# Patient Record
Sex: MALE | Race: BLACK OR AFRICAN AMERICAN
[De-identification: names, ages, dates, MRNs, and addresses within clinical notes are randomized per-mention and may not be internally consistent; named-entity substitution may affect disease eponyms.]

---

## 2019-01-14 ENCOUNTER — HOSPITAL ENCOUNTER (INPATIENT)
Dept: HOSPITAL 74 - JSAMEDAYSX | Age: 49
LOS: 3 days | Discharge: HOME | DRG: 304 | End: 2019-01-17
Attending: INTERNAL MEDICINE | Admitting: ORTHOPAEDIC SURGERY
Payer: COMMERCIAL

## 2019-01-14 VITALS — BODY MASS INDEX: 40.5 KG/M2

## 2019-01-14 DIAGNOSIS — E78.5: ICD-10-CM

## 2019-01-14 DIAGNOSIS — E11.9: ICD-10-CM

## 2019-01-14 DIAGNOSIS — M53.2X6: ICD-10-CM

## 2019-01-14 DIAGNOSIS — M51.16: Primary | ICD-10-CM

## 2019-01-14 DIAGNOSIS — M48.062: ICD-10-CM

## 2019-01-14 DIAGNOSIS — E87.1: ICD-10-CM

## 2019-01-14 DIAGNOSIS — I10: ICD-10-CM

## 2019-01-14 DIAGNOSIS — E66.9: ICD-10-CM

## 2019-01-14 DIAGNOSIS — I95.9: ICD-10-CM

## 2019-01-14 PROCEDURE — 4A1004G MONITORING OF CENTRAL NERVOUS ELECTRICAL ACTIVITY, INTRAOPERATIVE, OPEN APPROACH: ICD-10-PCS | Performed by: ORTHOPAEDIC SURGERY

## 2019-01-14 PROCEDURE — B01BZZZ FLUOROSCOPY OF SPINAL CORD: ICD-10-PCS | Performed by: ORTHOPAEDIC SURGERY

## 2019-01-14 PROCEDURE — 0QB00ZZ EXCISION OF LUMBAR VERTEBRA, OPEN APPROACH: ICD-10-PCS | Performed by: ORTHOPAEDIC SURGERY

## 2019-01-14 PROCEDURE — 0QN00ZZ RELEASE LUMBAR VERTEBRA, OPEN APPROACH: ICD-10-PCS | Performed by: ORTHOPAEDIC SURGERY

## 2019-01-14 PROCEDURE — 3E0T3BZ INTRODUCTION OF ANESTHETIC AGENT INTO PERIPHERAL NERVES AND PLEXI, PERCUTANEOUS APPROACH: ICD-10-PCS | Performed by: ORTHOPAEDIC SURGERY

## 2019-01-14 PROCEDURE — 0SG1071 FUSION OF 2 OR MORE LUMBAR VERTEBRAL JOINTS WITH AUTOLOGOUS TISSUE SUBSTITUTE, POSTERIOR APPROACH, POSTERIOR COLUMN, OPEN APPROACH: ICD-10-PCS | Performed by: ORTHOPAEDIC SURGERY

## 2019-01-14 RX ADMIN — SODIUM CHLORIDE, POTASSIUM CHLORIDE, SODIUM LACTATE AND CALCIUM CHLORIDE SCH MLS: 600; 310; 30; 20 INJECTION, SOLUTION INTRAVENOUS at 20:20

## 2019-01-14 RX ADMIN — INSULIN ASPART SCH: 100 INJECTION, SOLUTION INTRAVENOUS; SUBCUTANEOUS at 23:50

## 2019-01-14 RX ADMIN — ACETAMINOPHEN SCH MG: 10 INJECTION, SOLUTION INTRAVENOUS at 20:10

## 2019-01-14 RX ADMIN — SODIUM CHLORIDE, POTASSIUM CHLORIDE, SODIUM LACTATE AND CALCIUM CHLORIDE SCH MLS/HR: 600; 310; 30; 20 INJECTION, SOLUTION INTRAVENOUS at 23:51

## 2019-01-14 NOTE — CON.PULM
Consult


Consult Specialty:: ICU


Referred by:: Rebeca


Reason for Consultation:: ICU monitoring Post-op





- History of Present Illness


Chief Complaint: s/p revision laminectomies


History of Present Illness: 





48 yr old man with DMII on insulin and HTN s/p spinal surgery POD#0 for 

worsening back pain being monitored in the ICU.


Surgery as listed in OP note: 


1. L3, L4 revision laminectomies.  2. L3, L4 facetectomies/osteotomies.  3. 

Bilateral L3, L4 neurolysis.  4. Removal of hardware.  5. Inspection of fusion 

mass.  6. L3-S1 posterior instrumentation.  7. L3-S1 posterolateral 

arthrodesis.  8. Bone autograft.  9. Bone allograft.  10. Bone marrow 

aspiration.  11. Bilateral dorsal ramus nerve blocks L3-S1.  12. Intra-

operative fluoroscopy.  13. Intra-operative neural monitoring





currently on PCA, but pain is still 10/10 in lower back, nonradiating. c/o 

tingling in his left foot, same tingling he had prior to the surgery, not worse 

now.


family at bedside.


H&P in written chart.





Pmhx: HTN, DMII, HLD, obesity, PORSCHE on CPAP at home


Surghx: spinal surgery 2014


Allergies: cat dander, dust mite extraction, mold &smuts, pollen extract, no 

known drug allergies


Soc hx: soc drinker, nonsmoker, denies hx of drug use





PE:


NAD, laying on his back comfortably, alert & oriented x3


HEENT: trachea midline, no LAD, EOMI, ender, no oral lesions, facial symmetry


CV: s1, s2, without MRG


Lungs: anterior ausculation, CTAB


abd: soft, nontender, nondistended, quiet throughout


UE: 5/5 hand  UE, sensation grossly intact, 2+ b/l radial pulses


LE: +DP pulses b/l, sensation intact grossly. rom with extension and flexion 

intact at ankles and knees. no edema


- drain positioned on left side with dark blood











- History Source


History Provided By: Patient





- Alcohol/Substance Use


Hx Alcohol Use: No





- Smoking History


Smoking history: Never smoked





Home Medications





- Allergies


Allergies/Adverse Reactions: 


 Allergies











Allergy/AdvReac Type Severity Reaction Status Date / Time


 


No Known Allergies Allergy   Verified 01/14/19 10:30














- Home Medications


Home Medications: 


Ambulatory Orders





Calcium Carbonate/Vitamin D3 [Calcium 1,000 + D3 Caplet] 1 tab PO DAILY 01/11/ 19 


Cyclobenzaprine HCl [Flexeril -] 10 mg PO TID PRN 01/11/19 


Gabapentin 300 mg PO TID 01/11/19 


Insulin Degludec [Tresiba Flextouch U-100] 22 unit SQ HS 01/11/19 


Oxycodone HCl 30 mg PO Q4H 01/11/19 


metFORMIN HCL [Metformin HCl ER] 1,000 mg PO DAILY 01/11/19 


Lisinopril [Prinivil] 10 mg PO DAILY 01/14/19 











Physical Exam


Vital Sings: 


 Vital Signs











Temperature  98.4 F   01/14/19 21:15


 


Pulse Rate  89   01/14/19 21:30


 


Respiratory Rate  12   01/14/19 21:30


 


Blood Pressure  101/68   01/14/19 21:30


 


O2 Sat by Pulse Oximetry (%)  96   01/14/19 19:38














Assessment/Plan





48 yr old with DMII and HTN being monitered in the ICU POD#0 from spinal 

surgery. 





POD#0 from spinal surgery, instructed as per ortho note


- Pain control with dilaudid PCA 


 - will need bowel regimen when taking po


- incentive spirometry [id number]


- PT, OOB as per surgery


- drain in place, monitor I&O's


- adames to be removed when pt ambulatory


- pain control with dilauded PCA





Cardiovascular


HTN


- lisinopril 


HLD


- crestor HS





Pulmonary


cpap ordered to bedside





Endocrine


DMII last A1c 12/20 9.1 - uncontrolled


- BGM and NISS ACHS while inpatient, coverage to start when eating, resume 

metformin and tresiba at dc


- NPO until flatus as per surgical team





Prophylaxis


DVT


- mechanical only, TEDS/SCDs 


GI


- famotadine BID, bowel regimen when taking po





FEN


Diet


- NPO until flatus, then diabetic/low sodium

## 2019-01-14 NOTE — PN
Progress Note (short form)





- Note


Progress Note: 





48M s/p L3, L4 revision laminectomies; L3, L4 facetectomies/osteotomies; 

bilateral L3, L4 neurolysis; removal of hardware; inspection of fusion mass; L3-

S1 posterior instrumentation; L3-S1 posterolateral arthrodesis; bone graft; 

bone marrow aspiration POD #0.





-Admit to ICU post-op.





-Pain control: per anaesthesia team; recommend PCA; no NSAID's.


-DVT PPx:


   - Mechanical only: REMI's, SCD's.


-Incentive spirometry q15min.


-PT/OT/Rehab, OOB.


-WBAT B/L LE.


-q4h B/L LE NV checks.


-Post-op antibiotics x 2 doses.


-NPO until flatus.


-f/u AM labs.


-f/u drain output.


-f/u UA (urine cloudy at end of case).


-d/c Song catheter when patient ambulating comfortably.


-Care per ICU & medical hospitalist team.


-Discharge planning: f/u 1/25/2019 at [facility] [place] office; call for 

appointment; [phone].


-Will follow.





Nav Jose MD (Orthopaedic Surgery).

## 2019-01-14 NOTE — OP
Operative Note





- Note:


Operative Date: 01/14/19


Pre-Operative Diagnosis: 1. L3-L4, L4-L5 intervertebral disc disorders with 

lower extremity radiculopathy.  2. L3-4, L4-L5 adjacent level disease.  3. L3-

L5 spinal stenosis with neurogenic claudication.  4. L3-L5 segmental instability


Operation: 1. L3, L4 revision laminectomies.  2. L3, L4 facetectomies/

osteotomies.  3. Bilateral L3, L4 neurolysis.  4. Removal of hardware.  5. 

Inspection of fusion mass.  6. L3-S1 posterior instrumentation.  7. L3-S1 

posterolateral arthrodesis.  8. Bone autograft.  9. Bone allograft.  10. Bone 

marrow aspiration.  11. Bilateral dorsal ramus nerve blocks L3-S1.  12. Intra-

operative fluoroscopy.  13. Intra-operative neural monitoring


Post-Operative Diagnosis: Same as Pre-op


Surgeon: Nav Jose


Assistant: Juan Jose


Anesthesiologist/CRNA: Jennifer Leyva


Anesthesia: General, Local


Specimens Removed: L5, S1 hardware (4 x screws & caps, 2 x rods)


Estimated Blood Loss (mls): 600


Drains & Tubes with Location: 1 x superficial HemoVac


Blood Volume Replaced (mls): 140 (Cell Saver)


Fluid Volume Replaced (mls): 3,000 (Crystalloid)


Operative Report Dictated: Yes

## 2019-01-15 LAB
ANION GAP SERPL CALC-SCNC: 7 MMOL/L (ref 8–16)
BUN SERPL-MCNC: 14 MG/DL (ref 7–18)
CALCIUM SERPL-MCNC: 8.8 MG/DL (ref 8.5–10.1)
CHLORIDE SERPL-SCNC: 97 MMOL/L (ref 98–107)
CO2 SERPL-SCNC: 30 MMOL/L (ref 21–32)
CREAT SERPL-MCNC: 1 MG/DL (ref 0.55–1.3)
DEPRECATED RDW RBC AUTO: 15.3 % (ref 11.9–15.9)
GLUCOSE SERPL-MCNC: 107 MG/DL (ref 74–106)
HCT VFR BLD CALC: 36.7 % (ref 35.4–49)
HGB BLD-MCNC: 11.5 GM/DL (ref 11.7–16.9)
MAGNESIUM SERPL-MCNC: 1.8 MG/DL (ref 1.8–2.4)
MCH RBC QN AUTO: 27.9 PG (ref 25.7–33.7)
MCHC RBC AUTO-ENTMCNC: 31.5 G/DL (ref 32–35.9)
MCV RBC: 88.6 FL (ref 80–96)
PHOSPHATE SERPL-MCNC: 5.4 MG/DL (ref 2.5–4.9)
PLATELET # BLD AUTO: 214 K/MM3 (ref 134–434)
PMV BLD: 7.8 FL (ref 7.5–11.1)
POTASSIUM SERPLBLD-SCNC: 4.7 MMOL/L (ref 3.5–5.1)
RBC # BLD AUTO: 4.14 M/MM3 (ref 4–5.6)
SODIUM SERPL-SCNC: 134 MMOL/L (ref 136–145)
WBC # BLD AUTO: 7.4 K/MM3 (ref 4–10)

## 2019-01-15 RX ADMIN — SODIUM CHLORIDE, POTASSIUM CHLORIDE, SODIUM LACTATE AND CALCIUM CHLORIDE SCH MLS/HR: 600; 310; 30; 20 INJECTION, SOLUTION INTRAVENOUS at 15:48

## 2019-01-15 RX ADMIN — INSULIN ASPART SCH: 100 INJECTION, SOLUTION INTRAVENOUS; SUBCUTANEOUS at 11:38

## 2019-01-15 RX ADMIN — SODIUM CHLORIDE, POTASSIUM CHLORIDE, SODIUM LACTATE AND CALCIUM CHLORIDE SCH MLS/HR: 600; 310; 30; 20 INJECTION, SOLUTION INTRAVENOUS at 22:10

## 2019-01-15 RX ADMIN — SENNOSIDES SCH TAB: 8.6 TABLET, FILM COATED ORAL at 22:09

## 2019-01-15 RX ADMIN — ACETAMINOPHEN SCH MG: 10 INJECTION, SOLUTION INTRAVENOUS at 11:41

## 2019-01-15 RX ADMIN — ACETAMINOPHEN SCH MG: 10 INJECTION, SOLUTION INTRAVENOUS at 05:00

## 2019-01-15 RX ADMIN — INSULIN ASPART SCH: 100 INJECTION, SOLUTION INTRAVENOUS; SUBCUTANEOUS at 07:00

## 2019-01-15 RX ADMIN — CEFAZOLIN SODIUM SCH MLS/HR: 2 SOLUTION INTRAVENOUS at 00:00

## 2019-01-15 RX ADMIN — CEFAZOLIN SODIUM SCH MLS/HR: 2 SOLUTION INTRAVENOUS at 09:24

## 2019-01-15 RX ADMIN — SODIUM CHLORIDE, POTASSIUM CHLORIDE, SODIUM LACTATE AND CALCIUM CHLORIDE SCH: 600; 310; 30; 20 INJECTION, SOLUTION INTRAVENOUS at 23:45

## 2019-01-15 NOTE — PN
Physical Exam: 


SUBJECTIVE: Patient seen and examined this morning at bedside. Continues to 

have 8/10 , constant sharp pain at the wound site, worse with movement. Passed 

Flatus this morning. Tolerated breakfast without nausea, vomiting. Song 

catheter d/c'ed this morning. Denies any fevers, chills, chest pain, SOB, 

diarrhea.








OBJECTIVE:





 Vital Signs











 Period  Temp  Pulse  Resp  BP Sys/Mackay  Pulse Ox


 


 Last 24 Hr  97.7 F-99.4 F    10-19  /45-83  95-99











GENERAL: A&Ox3, NAD, lying with HOB elevated


HEAD: NCAT


EYES: PERRL, EOMI


ENT: Oropharynx clear without exudates, moist mucous membranes.


NECK: No JVD


LUNGS: CTA on anterior exam


HEART: Regular rate and rhythm, S1, S2 without murmur


ABDOMEN: Soft, nontender, nondistended, + bowel sounds, no guarding


EXTREMITIES: 2+ pulses, no edema


NEUROLOGICAL: Cranial nerves II through XII grossly intact. Normal speech, gait 

not observed. Upper extremity muscle strength 5/5. Right Lower extremity muslce 

strength 4/5 throughout. Left lower extremity muscle strength 1/5 to hip 

flexion. Decreased sensation on the Left lower extremity. 


SKIN: Warm, dry








 Laboratory Results - last 24 hr











  01/14/19 01/15/19 01/15/19





  19:51 05:30 05:30


 


WBC   7.4 


 


RBC   4.14 


 


Hgb   11.5 L 


 


Hct   36.7 


 


MCV   88.6 


 


MCH   27.9 


 


MCHC   31.5 L 


 


RDW   15.3 


 


Plt Count   214 


 


MPV   7.8 


 


Sodium    134 L


 


Potassium    4.7


 


Chloride    97 L


 


Carbon Dioxide    30


 


Anion Gap    7 L


 


BUN    14


 


Creatinine    1.0


 


Creat Clearance w eGFR    > 60


 


POC Glucometer  128  


 


Random Glucose    107 H


 


Calcium    8.8


 


Phosphorus    5.4 H


 


Magnesium    1.8








Active Medications





Hydromorphone HCl (Dilaudid Pca -)  10 mg PCA PCA [facility]; Protocol


   Stop: 01/17/19 20:14


   Last Admin: 01/14/19 20:20 Dose:  10 mg


Lactated Ringer's (Lactated Ringers Solution)  1,000 mls @ 125 mls/hr IV ASDIR 

[facility]


   Last Admin: 01/14/19 23:51 Dose:  125 mls/hr


Insulin Aspart (Novolog Vial Sliding Scale -)  1 vial SQ TIDAC [facility]; Protocol











ASSESSMENT/PLAN:


47 y/o M with PMHx of DMII and HTN POD#1 from Lumbar spinal surgery





#Neuro


POD#1 Lumbar spinal surgery, instructed as per ortho note


-Pain control via PO Oxycodone


-Incentive spirometry [id number]


-PT, OOB as per surgery


-Drain in place (1/14), monitor I&O's


-Song removed this AM, monitor urine output





#Cardiovascular


HTN


-Lisinopril 


HLD


-Crestor HS





#Pulmonary


-CPAP HS





#Endocrine


DMII 


-Last A1c 9.1


-BGM ISS ACHS 





#FEN


-PO Fluids


-continue to monitor for hyponatremia


-Diabetic Diet





#PPX


-DVT: SCDs 


-GI: Famotadine BID, bowel regimen when taking po





#Code Status: Full Code





Dispo: Transfer to [facility]-surg











Visit type





- Emergency Visit


Emergency Visit: Yes


ED Registration Date: 01/14/19


Care time: The patient presented to the Emergency Department on the above date 

and was hospitalized for further evaluation of their emergent condition.





- New Patient


This patient is new to me today: Yes


Date on this admission: 01/15/19





- Critical Care


Critical Care patient: Yes


Total Critical Care Time (in minutes): 40


Critical Care Statement: The care of this patient involved high complexity 

decision making to prevent further life threatening deterioration of the patient

's condition and/or to evaluate & treat vital organ system(s) failure or risk 

of failure.

## 2019-01-15 NOTE — OP
DATE OF OPERATION:  01/14/2019

 

SURGEON:  Nav Jose MD

 

ASSISTANT:  Juan Jose MD

 

PRE-OPERATIVE DIAGNOSES:  

1. L3-L4, L4-L5 intervertebral disc disorders with lower extremity 
radiculopathy.  

2. L3-4, L4-L5 adjacent level disease.  

3. L3-L5 spinal stenosis with neurogenic claudication.  

4. L3-L5 segmental instability



POST-OPERATIVE DIAGNOSES:  

1. L3, L4 revision laminectomies.  

2. L3, L4 facetectomies/osteotomies.  

3. Bilateral L3, L4 neurolysis.  

4. Removal of hardware.  

5. Inspection of fusion mass.  

6. L3-S1 posterior instrumentation.  

7. L3-S1 posterolateral arthrodesis.  

8. Bone autograft.  

9. Bone allograft.  

10. Bone marrow aspiration.  

11. Bilateral dorsal ramus nerve blocks L3, L4, L5, S1.

12. Intra-operative fluoroscopy.

13. Intra-operative neural monitoring

 

BLOOD LOSS:  650 mL; Cell Saver utilized.

 

PROCEDURE:  Patient correctly identified, brought to the operating room, placed 
prone

on a Jensen table.  Lumbar spine was prepped, window draped with Betadine scrub

solution, wiped off with alcohol, DuraPrep applied.  Kefzol 2 g and vancomycin 
1 g

given preoperative.  All bony points were padded appropriately.  Eyes were 
noted and

free of pressure, and position of the table was about 10 degrees anti-
Trendelenburg

for venous optic vein flow reasons.  The original wound was opened and then 
extended

proximally to expose the lumbar spine from L1 to S1.  He is a large, muscular 
man

and, hence, the need for this extent of procedure and length of wound; this to 
avoid

any excessive forces of self-retaining retraction and fear of sepsis.  The 
spinous

process of L3 and L4 were identified, dissected completely.  Soft tissues 
extending

out laterally over the facet joints to the tips of the transverse processes,

extending from L3-L4 right down to the L5 bone graft site.  The original screws 
were

dissected free of soft tissue, the hardware removed.  The fusion mass was 
inspected

and found to be completely solid.

 

The next part of the operation was a difficult neurosurgical endeavor.  This was

adherent, stuck-down scar tissue at L3 and L4 onto the theca where the stenosis 
was

clearly apparent on MRI.  This necessitated a meticulous freeing of soft tissue

elements off the bony margins of the inferior margins of each lamina as well as

laterally onto the vertebral canal.  Once the central portion of soft tissue was

freed off the bone utilizing a curette with the rounded edge facing the dura at 
all

times, the actual bone was resected using Leksell rongeurs as well as Kerrison

upcuts.  Once that had been performed and the vertebral canal identified, the 
thecal

elements clearly seen.  The challenge here was freeing the recesses which were

adherent and stuck down with scar tissue and reactive bone formation.  Using

osteotomes, splitting the pars interarticularis to the inferior facet at each 
level,

that is L3 and L4.  The bone was imploded inwards, and that is centrally 
towards the

canal and retrieved for bone graft purposes.  Each superior facet readily 
coming into

view revealed:

1.  Severe arthritic changes at each facet joint level.

2.  Complete blockage of the recess at this level, necessitating very cautious

freeing of soft tissue off the undersurface of the superior facet and the 
appropriate

undercutting facetectomy performed with a range of different-size Kerrison up-
cut

rongeurs.

 

Complete freeing of the thecal canal performed.  This entailed also a thorough 
fibrous

neurolysis of the L3 and L4 nerve roots, both left- and right-hand side.  The L3
-4

level was laterally extended to complete a full Smith-Montejo osteotomy to 
ensure

adequate lordosis to the spine.  Once this decompression had been performed, 
marrow

from the left posterior ilium was harvested via a Jamshidi needle placed into 
the

ilium on the left.  The pedicles of L3 and L4 were identified using anatomical

guidelines as well as positioning of the drill bits for each pedicle to seat the

screw based under the endplates of each level.  A lateral fluoroscopic x-ray

facilitated this process.  Each screw measured 6.5 x 45, excepting the right S1 
screw

which measured 6.5 x 35, and the left S1 screw measured 6.5 x 40.  Each screw 
was

tested with neuromonitoring by the neuromonitoring team and found to be well 
above

the said parameters, each screw being well above 20 mA.  The right L4 screw 
measured

12.  Lateral and AP fluoroscopic x-rays were then taken at that point showing

excellent positioning and seating of all components.  At this point, because of 
the

thickness of the dura and the difficulty in mobilization of the dura, it was 
elected

not to do any interbody cages.  If any interbody cages have to be entertained 
in the

future, these would be done via an LLIF-type approach.  Once the 
neuromonitoring was

completed, another gram of Kefzol was given.  The wounds were thoroughly lavaged

throughout the procedure.  The retractors were released readily every 15-20 
minutes

and repositioned and the muscle gently massaged.  The rods were contoured, 
fixed to

the screw heads by the appropriate torque device driven cap.  Solid fixation

achieved.  Bone grafting was a standard posterolateral arthrodesis, combination 
of

autologous bone expanded with allograft bone and mixed with the CD34 cells 
harvested

from the left bone marrow aspirate concentrate accordingly.  The wounds were

thoroughly lavaged once again.  All extraneous bone on the theca was removed, 
leaving

the vision of a central, widely-decompressed vertebral canal; well-seated

instrumentation lateral to this; and lateral to that, the bone graft in the

posterolateral arthrodesis site.  Closure in 4 layers.  This was thus a complex 
wound

closure, muscle 1 Vicryl, fascia 1 Vicryl, subcutaneous 1 and 2-0 Vicryl, skin

staples.  Drainage:  One 1/8-inch Hemovac subcutaneously only.  Operation was

difficult but went extremely well.  No complications.

 

 

MD MARK Crespo/[id number]

DD: 01/14/2019 19:52

DT: 01/15/2019 10:32

Job #:  [id number]

MTDD

## 2019-01-15 NOTE — PN
Physical Exam: 


SUBJECTIVE: Patient seen and examined at bedside. No acute events overnight. Pt 

admits to pain, but well-controlled. Denies chest pain, sob, abd pain. No other 

complaints.








OBJECTIVE:





 Vital Signs











Temperature  99.0 F   01/15/19 10:00


 


Pulse Rate  72   01/15/19 12:00


 


Respiratory Rate  15   01/15/19 12:00


 


Blood Pressure  100/59 L  01/15/19 12:00


 


O2 Sat by Pulse Oximetry (%)  99   01/15/19 14:17




















GENERAL: AAOx3. Comfortable. NAD. 


HEENT: AT/NC. EOMI. TODD. MMM. 


NECK: Trachea midline, full range of motion, supple. 


LUNGS: CTA B/L. No wheezes noted. Symmetric chest rise.


HEART: RRR. Normal S1 S2. No murmurs noted.


ABDOMEN: Soft NT/ND. +BS in all 4Q's. No masses noted. 


EXTREMITIES: 2+ pulses, warm, well-perfused, no edema. u/l b/l extremities. 5/5 

muscle strength in b/l UE. 4/5 RLE hip flexion. 5/5 RLE knee flexion/extension. 

3/5 LLE hip flexion. 5/5 b/l dorsi/plantarflexion. Decreased sensation in L foot

, dorsal and plantar surface. 


MSK: Surgical dressing in lumbar region c/d/i. 


NEUROLOGICAL: Cranial nerves II through XII grossly intact. Normal speech. 

Facial muscles intact. 


PSYCH: Normal mood, normal affect.


SKIN: Warm, dry, normal turgor, no rashes or lesions noted














 CBCD











WBC  7.4 K/mm3 (4.0-10.0)   01/15/19  05:30    


 


RBC  4.14 M/mm3 (4.00-5.60)   01/15/19  05:30    


 


Hgb  11.5 GM/dL (11.7-16.9)  L  01/15/19  05:30    


 


Hct  36.7 % (35.4-49)   01/15/19  05:30    


 


MCV  88.6 fl (80-96)   01/15/19  05:30    


 


MCHC  31.5 g/dl (32.0-35.9)  L  01/15/19  05:30    


 


RDW  15.3 % (11.9-15.9)   01/15/19  05:30    


 


Plt Count  214 K/MM3 (134-434)   01/15/19  05:30    


 


MPV  7.8 fl (7.5-11.1)   01/15/19  05:30    








 CMP











Sodium  134 mmol/L (136-145)  L  01/15/19  05:30    


 


Potassium  4.7 mmol/L (3.5-5.1)   01/15/19  05:30    


 


Chloride  97 mmol/L ()  L  01/15/19  05:30    


 


Carbon Dioxide  30 mmol/L (21-32)   01/15/19  05:30    


 


Anion Gap  7 MMOL/L (8-16)  L  01/15/19  05:30    


 


BUN  14 mg/dL (7-18)   01/15/19  05:30    


 


Creatinine  1.0 mg/dL (0.55-1.3)   01/15/19  05:30    


 


Creat Clearance w eGFR  > 60  (>60)   01/15/19  05:30    


 


Calcium  8.8 mg/dL (8.5-10.1)   01/15/19  05:30    














Active Medications





Lactated Ringer's (Lactated Ringers Solution)  1,000 mls @ 125 mls/hr IV ASDIR 

[facility]


   Last Admin: 01/15/19 15:48 Dose:  125 mls/hr


Insulin Aspart (Novolog Vial Sliding Scale -)  1 vial SQ TIDAC [facility]; Protocol


Oxycodone HCl (Roxicodone -)  5 mg PO Q4H PRN


   PRN Reason: PAIN LEVEL 1-5


Oxycodone HCl (Roxicodone -)  10 mg PO Q6H PRN


   PRN Reason: PAIN LEVEL 6-10








ASSESSMENT/PLAN:


49 y/o man with h/o DM , HTN, HL, and lower back pain who presented  for lumbar 

sx 





#L3-S1 disk disease with lumbar stenosis; s/p Lumbar sx. POD 1 


-Pt currently doing well, +flatus, antonio PO diet.


-cont PCA


-Diabetic diet


-PT when allowed out of bed


-Miralax/Colace/Senna


-incentive spirometer 





#HTN:  BP in 90s


-hold lisinopril for now as pt is hypotensive. Will resume when hypertensive.


-cont IVF 





#DM.


-cont SSI; 


-Hold home med Metformin and Tresiba





#Prophylaxis


-DVT: SCDs





#FEN


-no IVf


-recheck lytes in AM


-Diabetic diet








Visit type





- Emergency Visit


Emergency Visit: Yes


ED Registration Date: 01/14/19


Care time: The patient presented to the Emergency Department on the above date 

and was hospitalized for further evaluation of their emergent condition.





- New Patient


This patient is new to me today: Yes


Date on this admission: 01/15/19





- Critical Care


Critical Care patient: Yes


Total Critical Care Time (in minutes): 35


Critical Care Statement: The care of this patient involved high complexity 

decision making to prevent further life threatening deterioration of the patient

's condition and/or to evaluate & treat vital organ system(s) failure or risk 

of failure.

## 2019-01-15 NOTE — PN
Progress Note (short form)





- Note


Progress Note: 





Anesthesiology Post-op/Pain Service


48 y.o. man POD#1 s/p lumbar spine surgery under GA with post-op PCA.


Pt is sitting-up, feels well. He does admit to some pain but states that PCA 

helps. VSS. No other complaints.


48 y.o. man with stable post-operative course.


Continue PCA for now until pt. taking PO. Encouraged incentive spirometry.

## 2019-01-15 NOTE — PN
Teaching Attending Note


Name of Resident: Gaby Kearney





ATTENDING PHYSICIAN STATEMENT





I saw and evaluated the patient.


I reviewed the resident's note and discussed the case with the resident.


I agree with the resident's findings and plan as documented.








SUBJECTIVE:


No fever or chills . back pain is controlled with PCA. No abd pain, had flatus 

this am. no N/V. still has numbness in sole of his L foot but numbness in L 

thigh has resolved 





OBJECTIVE:


NAD .MMM


Lungs: CTAB 


CV: RRR


Abd: soft, Nl BS , NT, ND , obese 


Ext : no edema or erythema 


Neuro of LE:  


RLE: Hip felxion 4/5 limited by pain. knee flexion /extension 5/5, ankle 

dorsiflexion and plantar flexion 5/5 . 


LLE: hip flexion 2/5 due ot pain .  knee flexion and extension couldn't be 

assessed due ot pain. ankle dorsiflexion and plantar flexion 5/5. 


reflexes : Knee jerk 1+ on L , 2+ on right . neg babinski's 


decreased sensation on L foot ( dorsum and plantar area) 





ASSESSMENT AND PLAN:








49 y/o man with h/o DM , HTN, HL, and lower back pain who presented  for lumbar 

sx 





1- L3-S1 disk disease with lumbar stenosis s/p Lumbar sx. POD 1 


doing well , pain is controlled, and had flatus. tolerated breakfast 


- cont PCA


- cont diet 


- PT when allowed out  of bed.


- start bowel regimen


- incentive spirometer 





2- HTN:  BP in 90s . 


- hold lisinopril . can resume when htypertensive 


- cont IVF 





3- DM. sugar normal now.


- cont SSI 


- monitor the need to place on long acting if sugar is elevated 


- at home uses metformin and Tresiba 





4- DVT : SCds 


no need for GI px

## 2019-01-15 NOTE — PN
Teaching Attending Note


Name of Resident: Keith Cota





ATTENDING PHYSICIAN STATEMENT





I saw and evaluated the patient.


I reviewed the resident's note and discussed the case with the resident.


I agree with the resident's findings and plan as documented.








SUBJECTIVE:


Patient seen and examined in the ICU.  Awake and alert.  


Pain at surgical site: 7/10. 


No CP or SOB. 


Used CPAP overnight for a short time. 





 Intake & Output











 01/12/19 01/13/19 01/14/19 01/15/19





 23:59 23:59 23:59 23:59


 


Intake Total   3405 1407


 


Output Total   1350 1520


 


Balance   2055 -113


 


Weight   259 lb 7.745 oz 259 lb 7.745 oz








 Last Vital Signs











Temp Pulse Resp BP Pulse Ox


 


 99.0 F   72   15   100/59 L  99 


 


 01/15/19 10:00  01/15/19 12:00  01/15/19 12:00  01/15/19 12:00  01/15/19 09:00








Active Medications





Hydromorphone HCl (Dilaudid Pca -)  10 mg PCA PCA [facility]; Protocol


   Stop: 01/17/19 20:14


   Last Admin: 01/14/19 20:20 Dose:  10 mg


Lactated Ringer's (Lactated Ringers Solution)  1,000 mls @ 125 mls/hr IV ASDIR 

BAYLEE


   Last Admin: 01/14/19 23:51 Dose:  125 mls/hr


Insulin Aspart (Novolog Vial Sliding Scale -)  1 vial SQ TIDAC [facility]; Protocol





NAD, Awake and alert, NAD 


HEENT: trachea midline 


CV: S1, S2 


Lungs: CTAB


Abd: soft, nontender, nondistended, quiet throughout


UE: 5/5 hand  UE, sensation grossly intact, 2+ b/l radial pulses


LE: +DP pulses b/l, sensation intact grossly. rom with extension and flexion 

intact at ankles and knees. no edema


Skin: drain positioned on left side with dark blood








 Laboratory Results - last 24 hr











  01/14/19 01/15/19 01/15/19





  19:51 05:30 05:30


 


WBC   7.4 


 


RBC   4.14 


 


Hgb   11.5 L 


 


Hct   36.7 


 


MCV   88.6 


 


MCH   27.9 


 


MCHC   31.5 L 


 


RDW   15.3 


 


Plt Count   214 


 


MPV   7.8 


 


Sodium    134 L


 


Potassium    4.7


 


Chloride    97 L


 


Carbon Dioxide    30


 


Anion Gap    7 L


 


BUN    14


 


Creatinine    1.0


 


Creat Clearance w eGFR    > 60


 


POC Glucometer  128  


 


Random Glucose    107 H


 


Calcium    8.8


 


Phosphorus    5.4 H


 


Magnesium    1.8

















Assessment/Plan


POD # 1: 


1. L3, L4 revision laminectomies


2. L3, L4 facetectomies/osteotomies


3. Bilateral L3, L4 neurolysis


4. Removal of hardware


5. Inspection of fusion mass


6. L3-S1 posterior instrumentation


7. L3-S1 posterolateral arthrodesis


8. Bone autograft


9. Bone allograft


10. Bone marrow aspiration


11. Bilateral dorsal ramus nerve blocks L3-S1


12. Intra-operative fluoroscopy


13. Intra-operative neural monitoring





OSAS on home CPAP 


HTN 


HPL 


DM 








Pain control 


Incentive Spirometry 


VTE prophylaxis 


O2 as needed 


CPAP QHS 


Neuro checks 


Continue home meds 


Glycemic control 


Floor when cleared by surgical attending 








Dr Sharp

## 2019-01-16 LAB
ALBUMIN SERPL-MCNC: 3 G/DL (ref 3.4–5)
ALP SERPL-CCNC: 60 U/L (ref 45–117)
ALT SERPL-CCNC: 20 U/L (ref 13–61)
ANION GAP SERPL CALC-SCNC: 8 MMOL/L (ref 8–16)
APPEARANCE UR: CLEAR
AST SERPL-CCNC: 36 U/L (ref 15–37)
BASOPHILS # BLD: 0.4 % (ref 0–2)
BILIRUB SERPL-MCNC: 0.8 MG/DL (ref 0.2–1)
BILIRUB UR STRIP.AUTO-MCNC: NEGATIVE MG/DL
BUN SERPL-MCNC: 15 MG/DL (ref 7–18)
CALCIUM SERPL-MCNC: 8.1 MG/DL (ref 8.5–10.1)
CHLORIDE SERPL-SCNC: 96 MMOL/L (ref 98–107)
CO2 SERPL-SCNC: 29 MMOL/L (ref 21–32)
COLOR UR: (no result)
CREAT SERPL-MCNC: 1.1 MG/DL (ref 0.55–1.3)
DEPRECATED RDW RBC AUTO: 14.8 % (ref 11.9–15.9)
EOSINOPHIL # BLD: 0.7 % (ref 0–4.5)
GLUCOSE SERPL-MCNC: 103 MG/DL (ref 74–106)
HCT VFR BLD CALC: 33.4 % (ref 35.4–49)
HGB BLD-MCNC: 11.2 GM/DL (ref 11.7–16.9)
KETONES UR QL STRIP: (no result)
LEUKOCYTE ESTERASE UR QL STRIP.AUTO: NEGATIVE
LYMPHOCYTES # BLD: 19.6 % (ref 8–40)
MAGNESIUM SERPL-MCNC: 2.1 MG/DL (ref 1.8–2.4)
MCH RBC QN AUTO: 29.5 PG (ref 25.7–33.7)
MCHC RBC AUTO-ENTMCNC: 33.5 G/DL (ref 32–35.9)
MCV RBC: 88.1 FL (ref 80–96)
MONOCYTES # BLD AUTO: 12.4 % (ref 3.8–10.2)
NEUTROPHILS # BLD: 66.9 % (ref 42.8–82.8)
NITRITE UR QL STRIP: NEGATIVE
PH UR: 6 [PH] (ref 5–8)
PHOSPHATE SERPL-MCNC: 4.2 MG/DL (ref 2.5–4.9)
PLATELET # BLD AUTO: 198 K/MM3 (ref 134–434)
PMV BLD: 7.7 FL (ref 7.5–11.1)
POTASSIUM SERPLBLD-SCNC: 4 MMOL/L (ref 3.5–5.1)
PROT SERPL-MCNC: 6.3 G/DL (ref 6.4–8.2)
PROT UR QL STRIP: NEGATIVE
PROT UR QL STRIP: NEGATIVE
RBC # BLD AUTO: 3.79 M/MM3 (ref 4–5.6)
SODIUM SERPL-SCNC: 133 MMOL/L (ref 136–145)
SP GR UR: 1.01 (ref 1.01–1.03)
UROBILINOGEN UR STRIP-MCNC: NEGATIVE MG/DL (ref 0.2–1)
WBC # BLD AUTO: 9.4 K/MM3 (ref 4–10)

## 2019-01-16 RX ADMIN — POLYETHYLENE GLYCOL 3350 SCH GM: 17 POWDER, FOR SOLUTION ORAL at 10:13

## 2019-01-16 RX ADMIN — INSULIN ASPART SCH: 100 INJECTION, SOLUTION INTRAVENOUS; SUBCUTANEOUS at 06:01

## 2019-01-16 RX ADMIN — SODIUM CHLORIDE, POTASSIUM CHLORIDE, SODIUM LACTATE AND CALCIUM CHLORIDE SCH MLS/HR: 600; 310; 30; 20 INJECTION, SOLUTION INTRAVENOUS at 15:42

## 2019-01-16 RX ADMIN — INSULIN ASPART SCH: 100 INJECTION, SOLUTION INTRAVENOUS; SUBCUTANEOUS at 16:57

## 2019-01-16 RX ADMIN — ACETAMINOPHEN PRN MG: 325 TABLET ORAL at 16:24

## 2019-01-16 RX ADMIN — DOCUSATE SODIUM SCH MG: 100 CAPSULE, LIQUID FILLED ORAL at 10:13

## 2019-01-16 RX ADMIN — INSULIN ASPART SCH: 100 INJECTION, SOLUTION INTRAVENOUS; SUBCUTANEOUS at 11:24

## 2019-01-16 RX ADMIN — GABAPENTIN SCH MG: 300 CAPSULE ORAL at 10:14

## 2019-01-16 RX ADMIN — GABAPENTIN SCH MG: 300 CAPSULE ORAL at 21:21

## 2019-01-16 RX ADMIN — SENNOSIDES SCH TAB: 8.6 TABLET, FILM COATED ORAL at 21:21

## 2019-01-16 RX ADMIN — SODIUM CHLORIDE, POTASSIUM CHLORIDE, SODIUM LACTATE AND CALCIUM CHLORIDE SCH MLS/HR: 600; 310; 30; 20 INJECTION, SOLUTION INTRAVENOUS at 07:21

## 2019-01-16 NOTE — PATH
Surgical Pathology Report



Patient Name:  PARIL MASTERSON

Accession #:  

Med. Rec. #:  [id number]                                                        

   /Age/Gender:  1970 (Age: 48) / M

Account:  [id number]                                                          

             Location: [facility] MED/SURG

Taken:  2019

Received:  1/15/2019

Reported:  2019

Physicians:  Nav Jose M.D.

  



Specimen(s) Received

 HARDWARE FROM LUMBAR REGION 





Clinical History

Intravertebral disc disorder







Final Diagnosis

LUMBAR REGION, HARDWARE, REMOVAL:

SURGICAL HARDWARE. MACROSCOPIC DIAGNOSIS.





***Electronically Signed***

Carolina Bustamante M.D.





Gross Description

Received fresh labeled "hardware from lumbar region," are 2 gray metallic rods

averaging 3.7 cm in length. Also received within the same container are 8

metallic screws ranging from 0.4-6.0 cm in greatest dimension. No soft tissue is

present. No sections are submitted, gross only.

/1/15/2019



saudi/1/15/2019

## 2019-01-16 NOTE — HOSP
Subjective





- Review of Symptoms


Subjective: 





Was called to evaluate the patient for persistent hypotension. Patient has low 

BP in the 100's systolic at baseline. over the past 24 hrs, he has had BP in 

the 80's/40's despite being on LR @ 125 throughout. 





Upon evaluation, patient continues to c/o pain, but is A&O x3 and denies 

dizziness or lightheadedness. 





General: No: Chills, Fatigue


HEENT: No: Visual Changes


Musculoskeletal: Yes: Back Pain


Neurological: No: Weakness, Confusion





Physical Examination


Vital Signs: 


 Vital Signs











Temperature  101.4 F H  01/16/19 02:10


 


Pulse Rate  99 H  01/16/19 02:10


 


Respiratory Rate  20   01/16/19 02:10


 


Blood Pressure  88/44 L  01/16/19 02:10


 


O2 Sat by Pulse Oximetry (%)  96   01/16/19 00:45











Constitutional: Yes: No Distress, Calm


Neurological: Yes: Alert, Oriented


Labs: 


 CBC, BMP





 01/15/19 05:30 





 01/15/19 05:30 











Hospitalist Encounter


Assessment: 





Was called earlier in the night for fever to 101, which was attributed to post 

op fever. This fever responded to tylenol.





The cause of the patient's ssx is likely a combination of opiate pain 

medication and possible atalectasis/ routine post op fever, but given this 

young patient has persistently low bp's with tachycardia despite fluid 

resuscitation, sepsis must be ruled out. 





Will obtain Blood cx, lactic acid, CXR and will bolus the patient with 1L NS in 

addition to his current fluids. Signed the patient out to day team to reassess 

after bolus. 





Visit type





- Emergency Visit


Emergency Visit: Yes


ED Registration Date: 01/14/19


Care time: The patient presented to the Emergency Department on the above date 

and was hospitalized for further evaluation of their emergent condition.





- New Patient


This patient is new to me today: Yes


Date on this admission: 01/16/19





- Critical Care


Critical Care patient: No

## 2019-01-16 NOTE — PN
Physical Exam: 


SUBJECTIVE: Patient seen and examined at bedside. Per nurse, pt was hypotensive 

81/41 overnight, given IVf bolus. This AM BP now 117/61. Pt is complaining of 

pain as pain med was not given due to hypotension. Denies chest pain, sob, ha, 

dizziness, abd pain. Antonio PO diet.








OBJECTIVE:





 Vital Signs











Temperature  99.8 F H  01/16/19 07:10


 


Pulse Rate  95 H  01/16/19 07:10


 


Respiratory Rate  20   01/16/19 07:10


 


Blood Pressure  117/61   01/16/19 07:10


 


O2 Sat by Pulse Oximetry (%)  96   01/16/19 00:45




















GENERAL: AAOx3. Comfortable. NAD. 


HEENT: AT/NC. EOMI. TODD. MMM. 


NECK: Trachea midline, full range of motion, supple. 


LUNGS: CTA B/L. No wheezes noted. Symmetric chest rise.


HEART: RRR. Normal S1 S2. No murmurs noted.


ABDOMEN: Soft NT/ND. +BS in all 4Q's. No masses noted. 


EXTREMITIES: 2+ pulses, warm, well-perfused, no edema. u/l b/l extremities. 5/5 

muscle strength in b/l UE. 4/5 RLE hip flexion. 5/5 RLE knee flexion/extension. 

3/5 LLE hip flexion. 5/5 b/l dorsi/plantarflexion. Decreased sensation in L foot

, dorsal and plantar surface. 


MSK: Surgical dressing in lumbar region c/d/i. 


NEUROLOGICAL: Cranial nerves II through XII grossly intact. Normal speech. 

Facial muscles intact. 


PSYCH: Normal mood, normal affect.


SKIN: Warm, dry, normal turgor, no rashes or lesions noted

















 CBC, BMP





 01/16/19 06:30 





 01/16/19 06:30 














Active Medications





Docusate Sodium (Colace -)  100 mg PO DAILY BAYLEE


Lactated Ringer's (Lactated Ringers Solution)  1,000 mls @ 125 mls/hr IV ASDIR 

BAYLEE


   Last Admin: 01/16/19 07:21 Dose:  125 mls/hr


Insulin Aspart (Novolog Vial Sliding Scale -)  1 vial SQ TIDAC [facility]; Protocol


   Last Admin: 01/16/19 06:01 Dose:  Not Given


Oxycodone HCl (Roxicodone -)  5 mg PO Q4H PRN


   PRN Reason: PAIN LEVEL 1-5


   Last Admin: 01/16/19 01:32 Dose:  5 mg


Oxycodone HCl (Roxicodone -)  10 mg PO Q6H PRN


   PRN Reason: PAIN LEVEL 6-10


   Last Admin: 01/16/19 07:20 Dose:  10 mg


Polyethylene Glycol (Miralax (For Daily Use) -)  17 gm PO DAILY BAYLEE


Senna (Senna -)  1 tab PO HS BAYLEE


   Last Admin: 01/15/19 22:09 Dose:  1 tab











ASSESSMENT/PLAN:


47 y/o man with h/o DM , HTN, HL, and lower back pain who presented for lumbar 

sx.





#L3-S1 disk disease with lumbar stenosis; s/p Lumbar sx, POD #2 


-Pt currently doing well, +flatus, antonio PO diet.


-Oxycodone 5 Q4H, 10 Q6H for pain


-Gabapentin 600 mg PO TID


-Diabetic diet


-PT when OOB


-Miralax/Colace/Senna


-Incentive spirometer 





#HTN:  BP ~80/40s overnight, now 117/61.


-hold Lisinopril for now as pt is hypotensive. Will resume when hypertensive.


-cont IVF 


-Per night team, pt was hypotensive and febrile overnight. Although pt is post-

op, septic work up was done since pt did not initially respond to fluid 

resuscitation with LR at 125 standing and IV bolus 1L. CXR neg, BCx/UCx pending

, lac wnl 0.7, WBC wnl.





#DM.


-cont SSI TIDAC


-Hold home med Metformin and Tresiba





#Prophylaxis


-DVT: SCDs





#FEN


-LR @ 125


-recheck lytes in AM


-Diabetic diet





dispo


-full code


-F/u 1/25/2019 at [facility] office; call for appointment; [phone].





Visit type





- Emergency Visit


Emergency Visit: Yes


ED Registration Date: 01/14/19


Care time: The patient presented to the Emergency Department on the above date 

and was hospitalized for further evaluation of their emergent condition.





- New Patient


This patient is new to me today: No





- Critical Care


Critical Care patient: No

## 2019-01-16 NOTE — PN
Teaching Attending Note


Name of Resident: Gaby Kearney





ATTENDING PHYSICIAN STATEMENT





I saw and evaluated the patient.


I reviewed the resident's note and discussed the case with the resident.


I agree with the resident's findings and plan as documented.








SUBJECTIVE:


Patient is comfortable with no acute distress. continues to have pain but 

better. 





OBJECTIVE:


 Vital Signs











Temperature  99.1 F   01/16/19 09:00


 


Pulse Rate  101 H  01/16/19 09:00


 


Respiratory Rate  20   01/16/19 09:00


 


Blood Pressure  109/64   01/16/19 09:00


 


O2 Sat by Pulse Oximetry (%)  96   01/16/19 00:45








GENERAL: AAOx3. Comfortable. NAD. 


HEENT: AT/NC. EOMI. TODD. MMM. 


NECK: Trachea midline, full range of motion, supple. 


LUNGS: CTA B/L. No wheezes noted. 


HEART: mild tachycardia,  Normal S1 S2. No murmurs noted.


ABDOMEN: Soft NT/ND.  No masses noted. 


EXTREMITIES: 2+ pulses, warm, well-perfused, no edema. 


MSK: as per 


NEUROLOGICAL: exam as per 


PSYCH: Normal mood, normal affect.


SKIN: Warm, dry, normal turgor, no rashes or lesions noted


 


 CBCD











WBC  9.4 K/mm3 (4.0-10.0)   01/16/19  06:30    


 


RBC  3.79 M/mm3 (4.00-5.60)  L  01/16/19  06:30    


 


Hgb  11.2 GM/dL (11.7-16.9)  L  01/16/19  06:30    


 


Hct  33.4 % (35.4-49)  L  01/16/19  06:30    


 


MCV  88.1 fl (80-96)   01/16/19  06:30    


 


MCHC  33.5 g/dl (32.0-35.9)   01/16/19  06:30    


 


RDW  14.8 % (11.9-15.9)   01/16/19  06:30    


 


Plt Count  198 K/MM3 (134-434)   01/16/19  06:30    


 


MPV  7.7 fl (7.5-11.1)   01/16/19  06:30    








 CMP











Sodium  133 mmol/L (136-145)  L  01/16/19  06:30    


 


Potassium  4.0 mmol/L (3.5-5.1)   01/16/19  06:30    


 


Chloride  96 mmol/L ()  L  01/16/19  06:30    


 


Carbon Dioxide  29 mmol/L (21-32)   01/16/19  06:30    


 


Anion Gap  8 MMOL/L (8-16)   01/16/19  06:30    


 


BUN  15 mg/dL (7-18)   01/16/19  06:30    


 


Creatinine  1.1 mg/dL (0.55-1.3)   01/16/19  06:30    


 


Creat Clearance w eGFR  > 60  (>60)   01/16/19  06:30    


 


Random Glucose  103 mg/dL ()   01/16/19  06:30    


 


Calcium  8.1 mg/dL (8.5-10.1)  L  01/16/19  06:30    


 


Total Bilirubin  0.8 mg/dL (0.2-1)   01/16/19  06:30    


 


AST  36 U/L (15-37)   01/16/19  06:30    


 


ALT  20 U/L (13-61)   01/16/19  06:30    


 


Alkaline Phosphatase  60 U/L ()   01/16/19  06:30    


 


Total Protein  6.3 g/dl (6.4-8.2)  L  01/16/19  06:30    


 


Albumin  3.0 g/dl (3.4-5.0)  L  01/16/19  06:30    








 Current Medications











Generic Name Dose Route Start Last Admin





  Trade Name Freq  PRN Reason Stop Dose Admin


 


Docusate Sodium  100 mg  01/16/19 10:00  01/16/19 10:13





  Colace -  PO   100 mg





  DAILY BAYLEE   Administration





     





     





     





     


 


Gabapentin  600 mg  01/16/19 10:00  01/16/19 10:14





  Neurontin -  PO   600 mg





  BID BAYLEE   Administration





     





     





     





     


 


Lactated Ringer's  1,000 mls @ 125 mls/hr  01/15/19 23:14  01/16/19 07:21





  Lactated Ringers Solution  IV   125 mls/hr





  ASDIR BAYLEE   Administration





     





     





     





     


 


Insulin Aspart  1 vial  01/16/19 07:00  01/16/19 11:24





  Novolog Vial Sliding Scale -  SQ   Not Given





  TIDAC BAYLEE   





     





     





  Protocol   





     


 


Oxycodone HCl  5 mg  01/15/19 23:14  01/16/19 01:32





  Roxicodone -  PO   5 mg





  Q4H PRN   Administration





  PAIN LEVEL 1-5   





     





     





     


 


Oxycodone HCl  10 mg  01/15/19 23:14  01/16/19 07:20





  Roxicodone -  PO   10 mg





  Q6H PRN   Administration





  PAIN LEVEL 6-10   





     





     





     


 


Polyethylene Glycol  17 gm  01/16/19 10:00  01/16/19 10:13





  Miralax (For Daily Use) -  PO   17 gm





  DAILY BAYLEE   Administration





     





     





     





     


 


Senna  1 tab  01/15/19 22:00  01/15/19 22:09





  Senna -  PO   1 tab





  HS BAYLEE   Administration





     





     





     





     








 Home Medications











 Medication  Instructions  Recorded


 


Calcium Carbonate/Vitamin D3 1 tab PO DAILY 01/11/19





[Calcium 1,000 + D3 Caplet]  


 


Cyclobenzaprine HCl [Flexeril -] 10 mg PO BID PRN 01/11/19


 


Gabapentin 300 mg PO HS 01/11/19


 


Insulin Degludec [Tresiba 22 unit SQ HS 01/11/19





Flextouch U-100]  


 


Oxycodone HCl 30 mg PO Q4H 01/11/19


 


Lisinopril 10 mg PO DAILY 01/15/19


 


Metformin HCl [Glucophage] 1,000 mg PO BID 01/15/19


 


Rosuvastatin [Crestor -] 40 mg PO DAILY 01/15/19














ASSESSMENT AND PLAN:


Patient is a 47yo man with PMhx of DM , HTN, HL, presented with Low back pain s/

p  lumbar sx 





# POD #3 L3-S1 disk disease with lumbar stenosis. 





# acute hyponatremia: continue IVf , monitor the level





# HTN: stable BP, continue to hold lisinopril . resume when bp is not 

controlled. cont IVF 





# T2DM.  cont SSI , continue to  monitor.at home uses metformin and Tresiba 





 DVT : SCds 


 GI Px: no need

## 2019-01-16 NOTE — PN
Progress Note (short form)





- Note


Progress Note: 





Post op day#2.Patient stable and c/o pain score of 7-8/10 on PRN pain 

medication.Dilaudid PCA was DC yesterday because of hypotension.Will see if i 

can add more pain medication.No any anesthesia related problem.Patient DC from 

the anesthesia care.

## 2019-01-17 VITALS — TEMPERATURE: 99.7 F | DIASTOLIC BLOOD PRESSURE: 63 MMHG | HEART RATE: 97 BPM | SYSTOLIC BLOOD PRESSURE: 117 MMHG

## 2019-01-17 LAB
ANION GAP SERPL CALC-SCNC: 10 MMOL/L (ref 8–16)
BUN SERPL-MCNC: 11 MG/DL (ref 7–18)
CALCIUM SERPL-MCNC: 8.3 MG/DL (ref 8.5–10.1)
CHLORIDE SERPL-SCNC: 100 MMOL/L (ref 98–107)
CO2 SERPL-SCNC: 26 MMOL/L (ref 21–32)
CREAT SERPL-MCNC: 0.9 MG/DL (ref 0.55–1.3)
DEPRECATED RDW RBC AUTO: 14.9 % (ref 11.9–15.9)
GLUCOSE SERPL-MCNC: 127 MG/DL (ref 74–106)
HCT VFR BLD CALC: 32.5 % (ref 35.4–49)
HGB BLD-MCNC: 11 GM/DL (ref 11.7–16.9)
MCH RBC QN AUTO: 29.7 PG (ref 25.7–33.7)
MCHC RBC AUTO-ENTMCNC: 33.9 G/DL (ref 32–35.9)
MCV RBC: 87.6 FL (ref 80–96)
PLATELET # BLD AUTO: 196 K/MM3 (ref 134–434)
PMV BLD: 7.7 FL (ref 7.5–11.1)
POTASSIUM SERPLBLD-SCNC: 4.2 MMOL/L (ref 3.5–5.1)
RBC # BLD AUTO: 3.71 M/MM3 (ref 4–5.6)
SODIUM SERPL-SCNC: 135 MMOL/L (ref 136–145)
WBC # BLD AUTO: 8.8 K/MM3 (ref 4–10)

## 2019-01-17 RX ADMIN — ACETAMINOPHEN PRN MG: 325 TABLET ORAL at 00:51

## 2019-01-17 RX ADMIN — DOCUSATE SODIUM SCH MG: 100 CAPSULE, LIQUID FILLED ORAL at 10:03

## 2019-01-17 RX ADMIN — POLYETHYLENE GLYCOL 3350 SCH GM: 17 POWDER, FOR SOLUTION ORAL at 10:03

## 2019-01-17 RX ADMIN — INSULIN ASPART SCH: 100 INJECTION, SOLUTION INTRAVENOUS; SUBCUTANEOUS at 06:27

## 2019-01-17 RX ADMIN — INSULIN ASPART SCH UNITS: 100 INJECTION, SOLUTION INTRAVENOUS; SUBCUTANEOUS at 13:11

## 2019-01-17 RX ADMIN — GABAPENTIN SCH MG: 300 CAPSULE ORAL at 10:03

## 2019-01-17 NOTE — PN
Teaching Attending Note


Name of Resident: Gaby Kearney





ATTENDING PHYSICIAN STATEMENT





I saw and evaluated the patient.


I reviewed the resident's note and discussed the case with the resident.


I agree with the resident's findings and plan as documented.








SUBJECTIVE:


Patient is comfortable with no acute distress.





OBJECTIVE:


 Vital Signs











Temperature  99.7 F H  01/17/19 10:00


 


Pulse Rate  97 H  01/17/19 10:00


 


Respiratory Rate  20   01/17/19 10:00


 


Blood Pressure  117/63   01/17/19 10:00


 


O2 Sat by Pulse Oximetry (%)  96   01/17/19 09:00








GENERAL: AAOx3. Comfortable. NAD. 


HEENT: AT/NC. EOMI. TODD. MMM. 


NECK: Trachea midline, full range of motion, supple. 


LUNGS: CTA B/L. No wheezes noted. 


HEART: mild tachycardia,  Normal S1 S2. No murmurs noted.


ABDOMEN: Soft NT/ND.  No masses noted. 


EXTREMITIES: 2+ pulses, warm, well-perfused, no edema. 


MSK: as per 


NEUROLOGICAL: exam as per 


PSYCH: Normal mood, normal affect.


SKIN: Warm, dry, normal turgor, no rashes or lesions noted


 CBCD











WBC  8.8 K/mm3 (4.0-10.0)   01/17/19  06:30    


 


RBC  3.71 M/mm3 (4.00-5.60)  L  01/17/19  06:30    


 


Hgb  11.0 GM/dL (11.7-16.9)  L  01/17/19  06:30    


 


Hct  32.5 % (35.4-49)  L  01/17/19  06:30    


 


MCV  87.6 fl (80-96)   01/17/19  06:30    


 


MCHC  33.9 g/dl (32.0-35.9)   01/17/19  06:30    


 


RDW  14.9 % (11.9-15.9)   01/17/19  06:30    


 


Plt Count  196 K/MM3 (134-434)   01/17/19  06:30    


 


MPV  7.7 fl (7.5-11.1)   01/17/19  06:30    








 CMP











Sodium  135 mmol/L (136-145)  L  01/17/19  06:30    


 


Potassium  4.2 mmol/L (3.5-5.1)   01/17/19  06:30    


 


Chloride  100 mmol/L ()   01/17/19  06:30    


 


Carbon Dioxide  26 mmol/L (21-32)   01/17/19  06:30    


 


Anion Gap  10 MMOL/L (8-16)   01/17/19  06:30    


 


BUN  11 mg/dL (7-18)   01/17/19  06:30    


 


Creatinine  0.9 mg/dL (0.55-1.3)   01/17/19  06:30    


 


Creat Clearance w eGFR  > 60  (>60)   01/17/19  06:30    


 


Random Glucose  127 mg/dL ()  H  01/17/19  06:30    


 


Calcium  8.3 mg/dL (8.5-10.1)  L  01/17/19  06:30    


 


Total Bilirubin  0.8 mg/dL (0.2-1)   01/16/19  06:30    


 


AST  36 U/L (15-37)   01/16/19  06:30    


 


ALT  20 U/L (13-61)   01/16/19  06:30    


 


Alkaline Phosphatase  60 U/L ()   01/16/19  06:30    


 


Total Protein  6.3 g/dl (6.4-8.2)  L  01/16/19  06:30    


 


Albumin  3.0 g/dl (3.4-5.0)  L  01/16/19  06:30    








 Home Medications











 Medication  Instructions  Recorded


 


Calcium Carbonate/Vitamin D3 1 tab PO DAILY 01/11/19





[Calcium 1,000 + D3 Caplet]  


 


Cyclobenzaprine HCl [Flexeril -] 10 mg PO BID PRN 01/11/19


 


Gabapentin 300 mg PO HS 01/11/19


 


Insulin Degludec [Tresiba 22 unit SQ HS 01/11/19





Flextouch U-100]  


 


Oxycodone HCl 30 mg PO Q4H 01/11/19


 


Lisinopril 10 mg PO DAILY 01/15/19


 


Metformin HCl [Glucophage] 1,000 mg PO BID 01/15/19


 


Rosuvastatin [Crestor -] 40 mg PO DAILY 01/15/19


 


Walker [Ultra-Light Rollator] 1 each  ASDIR #1 each 01/17/19














ASSESSMENT AND PLAN:


Patient is a 49yo man with PMhx of DM , HTN, HL, presented with Low back pain s/

p  lumbar sx 





# POD #4 L3-S1 disk disease with lumbar stenosis. follow with  in a week

, patient has 30 day supply of pain medications.





# acute hyponatremia: improved post IVf. 





# HTN: stable.  





# T2DM.  continue home meds.  





discharge patient home today.

## 2019-01-17 NOTE — PN
Progress Note (short form)





- Note


Progress Note: 





POD#3


On the floor


C/O incisional pain  Dysasthesia feet ? reperfusion


Did pass flatus





Vitals as per chart


Wound Bandage dry  drain pulled


Neuro   As per baseline  all motots fully intact





ASSESS   Doing well





PLAN   Toradol added to meds


      PT   Mobilize


      Pain Mx


      D/C planning home ?Fri/Sat


      See in office 1 week.

## 2019-01-18 NOTE — DS
Physical Exam: 


SUBJECTIVE: Patient seen and examined








OBJECTIVE:





PHYSICAL EXAM





GENERAL: AAOx3. Comfortable. NAD. 


HEENT: AT/NC. EOMI. TODD. MMM. 


NECK: Trachea midline, full range of motion, supple. 


LUNGS: CTA B/L. No wheezes noted. Symmetric chest rise.


HEART: RRR. Normal S1 S2. No murmurs noted.


ABDOMEN: Soft NT/ND. +BS in all 4Q's. No masses noted. 


EXTREMITIES: 2+ pulses, warm, well-perfused, no edema. u/l b/l extremities. 5/5 

muscle strength in b/l UE. 4/5 RLE hip flexion. 5/5 RLE knee flexion/extension. 

3/5 LLE hip flexion. 5/5 b/l dorsi/plantarflexion. Decreased sensation in L foot

, dorsal and plantar surface. 


MSK: Surgical dressing in lumbar region c/d/i. 


NEUROLOGICAL: Cranial nerves II through XII grossly intact. Normal speech. 

Facial muscles intact. 


PSYCH: Normal mood, normal affect.


SKIN: Warm, dry, normal turgor, no rashes or lesions noted





LABS


 Laboratory Results - last 24 hr











  01/16/19 01/16/19 01/16/19





  05:51 11:23 16:56


 


POC Glucometer  117  119  105














  01/16/19 01/17/19 01/17/19





  20:56 05:18 13:11


 


POC Glucometer  116  113  162











HOSPITAL COURSE:





Date of Admission:01/14/19





49 y/o man with h/o DM , HTN, HLD presented with back pain 2/2 lumbar stenosis, 

s/p lumbar surgery. Pt had no complications gely or post-op. He was further 

monitored in the hospital for pain control and was also seen by PT. His 

symptoms improved and was subsequently discharged home. He was advised to 

continue taking Oxycodone as prescribed previously by Dr. Jose for pain. He 

was also advised to continue outpatient PT and to follow up with his PCP and 

Dr. Jose in 1 week.





Date of Discharge: 01/18/19











Minutes to complete discharge: 36





Discharge Summary


Reason For Visit: INTERVERTEBRAL DISC DISORDERS W/LUMB RADICULOP


Condition: Improved





- Instructions


Diet, Activity, Other Instructions: 


You were seen in the hospital for a lumbar spine surgery due to lumbar stenosis 

(narrowing of your lumbar spine). After your surgery, your were monitored in 

the hospital. Throughout your hospital stay, your symptoms improved. You are 

being discharged home.








MEDICAL RECOMMENDATIONS


 You will need to follow up with Dr. Jose for your post-op visit.  Make sure 

to take COLACE 100mg orally for 3 x per day to avoid constipation.





Please continue taking your home meds as directed.





REFERRALS


Please follow up with Dr. Jose on 1/25/2019 at [facility] office

; call for appointment; [phone].





If you experience persistent shortness of breath, chest pain, tingling or 

numbness in your extremities, problems walking, urinary/bowel incontinence, 

worsening tremors, seizures, or other associated symptoms, please proceed to 

your nearest emergency room immediately. 


Referrals: 


Juan Jose MD [Staff Physician] - 01/25/19


Disposition: HOME





- Home Medications


Comprehensive Discharge Medication List: 


Ambulatory Orders





Calcium Carbonate/Vitamin D3 [Calcium 1,000 + D3 Caplet] 1 tab PO DAILY 01/11/ 19 


Cyclobenzaprine HCl [Flexeril -] 10 mg PO BID PRN 01/11/19 


Gabapentin 300 mg PO HS 01/11/19 


Insulin Degludec [Tresiba Flextouch U-100] 22 unit SQ HS 01/11/19 


Oxycodone HCl 30 mg PO Q4H 01/11/19 


Lisinopril 10 mg PO DAILY 01/15/19 


Metformin HCl [Glucophage] 1,000 mg PO BID 01/15/19 


Rosuvastatin [Crestor -] 40 mg PO DAILY 01/15/19 


Walker [Ultra-Light Rollator] 1 each  ASDIR #1 each 01/17/19 








This patient is new to me today: No


Emergency Visit: Yes


ED Registration Date: 01/14/19


Care time: The patient presented to the Emergency Department on the above date 

and was hospitalized for further evaluation of their emergent condition.


Critical Care patient: No





- Discharge Referral


Referred to [facility] No